# Patient Record
(demographics unavailable — no encounter records)

---

## 2025-02-20 NOTE — PLAN
[FreeTextEntry1] : 61 year M presented withnR foot submet 2 wound to subQ 2/'2 diabetic neuropathy - Pt seen and evaluated - R foot submet 2 wound to subQ, no malodor, no pus, no acute signs of infection - Uisng 15 blade, wound debridement to level of SubQ and not beyond, well tolertaed - TEREZA/PVR: NCV - Recommended vasular workup - Recommended Santyl to be applied to wound followed by DSD - Prescribed Doxycycline - RTC in 2 weeks

## 2025-03-04 NOTE — DATA REVIEWED
[FreeTextEntry1] : R TEREZA N/C R TBI 0.11 (toe pressure 17mmHg)  L TEREZA N/C L TBI 0.59 (toe pressure 90mmHg)

## 2025-03-04 NOTE — PHYSICAL EXAM
[Respiratory Effort] : normal respiratory effort [Normal Rate and Rhythm] : normal rate and rhythm [2+] : left 2+ [0] : left 0 [Skin Ulcer] : ulcer [Alert] : alert [Oriented to Person] : oriented to person [Oriented to Place] : oriented to place [Oriented to Time] : oriented to time [Calm] : calm [Ankle Swelling (On Exam)] : not present [Varicose Veins Of Lower Extremities] : not present [] : not present [Abdomen Tenderness] : ~T ~M No abdominal tenderness [de-identified] : appears stated age [de-identified] : normocephalic, atraumatic [de-identified] : supple

## 2025-03-04 NOTE — ASSESSMENT
[FreeTextEntry1] : Problem #1 peripheral vascular disease chronic limb threatening ischemia of the right lower extremity, Jackson 5 diminished toe pressure  will schedule for right leg angiogram, possible revascularization risks including but not limited to bleeding, infection, vessel injury, vessel thrombosis discussed with patient

## 2025-03-19 NOTE — HISTORY OF PRESENT ILLNESS
[FreeTextEntry1] : 61-year-old man with history of diabetes mellitus and hypertension presents with chronic right foot ulcer. Has known history of peripheral arterial disease. He complains of intermittent rest pain in his right foot.  03/10/25 - right leg diagnostic angiogram

## 2025-04-22 NOTE — HISTORY OF PRESENT ILLNESS
[FreeTextEntry1] : 61-year-old man with history of diabetes mellitus and hypertension presents with chronic right foot ulcer. Has known history of peripheral arterial disease. He complains of intermittent rest pain in his right foot.  03/10/25 - right leg diagnostic angiogram 03/19/25 - No interval changes. 04/08/25 - Presents to discuss right lower extremity transcatheter arterialization of the deep veins with LimFlow further.

## 2025-04-30 NOTE — HISTORY OF PRESENT ILLNESS
[FreeTextEntry1] : 61-year-old man with history of diabetes mellitus and hypertension presents with chronic right foot ulcer. Has known history of peripheral arterial disease. He complains of intermittent rest pain in his right foot.  03/10/25 - right leg diagnostic angiogram 03/19/25 - No interval changes. 04/08/25 - Presents to discuss right lower extremity transcatheter arterialization of the deep veins with LimFlow further. 04/22/25 - right foot wound has worsened. denies fevers or chills.

## 2025-05-01 NOTE — HISTORY OF PRESENT ILLNESS
[FreeTextEntry1] : Pt presented to wound care for R foot wound has been packing the wound then applying Santyl. Wound started by stepping on metal parts. [facility] for [facility] cellulitis, Pt was discharged on 1/15 on Keflex for 10 days. Pt had a limflow 2 weeks ago with Dr. Montague but it was not succssful. Not going for 2nd opnion at . Pt has been on Augmentin for a week. Pt has been doing dressing change by himself.

## 2025-05-01 NOTE — PLAN
[FreeTextEntry1] : 61 year M presented withnR foot submet 2 wound to subQ 2/'2 diabetic neuropathy - Pt seen and evaluated - R foot submet 2 wound to subQ, no malodor, no pus, no acute signs of infection - Uisng 15 blade, wound debridement to level of SubQ and not beyond, well tolertaed - TEREZA/PVR: NCV - s/p RLE Limflow 2 weeks ago but failed, Pt is seeking 2nd opinion at  - Recommended Santyl to be applied to wound followed by DSD - Continue current 1 week of augmentin then prescribed for another 2 weeks - Rec hyperbaric oxygen - RTC in 3 weeks

## 2025-05-13 NOTE — PROCEDURE
[FreeTextEntry1] : LE arterial duplex performed to evaluate LE perfusion as previous TEREZA/PVR measurements demonstrates non-compressible arteries and dampened waveforms at the ankles b/l demonstrates

## 2025-05-13 NOTE — ADDENDUM
[FreeTextEntry1] : This note was written by Joesph Jarrett, acting as a scribe for Dr. Aroldo Jack.  I, Dr. Aroldo Jack, have read and attest that all the information, medical decision-making, and discharge instructions within are true and accurate.  Mr. Nestor Gongora is a 61M w/ HTN, DM, and PAD who developed non-healing plantar R foot wound after he stepped on metal pieces requiring debridement s/p LLE limb-flow DVA procedure at [facility] by other vascular providers. The wound has gotten worse and he now presents to me for a 2nd opinion. Angiogram reviewed. Unfortunately, he failed DVA and has poor distal pedal arterial circulation. His wound is also a worrisome location with pressure. Arterial duplex US didn't show new obvious arterial lesions compared to what is known. I discussed his options are limited and I set possible expectations that if his wound gets worse he may ultimately need TMA vs BKA. He needs better local wound care and offloading. I agree with topical O2. I explained we will see if there is any improvement in his wound over next 2 weeks. He may need repeat angiogram. He appeared appreciative of my evaluation.  I, Dr. Aroldo Jack, personally performed the evaluation and management (E/M) services for this new patient.  That E/M includes conducting the initial examination, assessing all conditions, and establishing the plan of care.  Today, my ACP, Joesph Jarrett, was here to observe my evaluation and management services for this patient to be followed going forward.

## 2025-05-13 NOTE — PHYSICAL EXAM
[Normal Thyroid] : the thyroid was normal [Normal Breath Sounds] : Normal breath sounds [Respiratory Effort] : normal respiratory effort [Normal Heart Sounds] : normal heart sounds [Normal Rate and Rhythm] : normal rate and rhythm [Ankle Swelling Bilaterally] : bilaterally  [Ankle Swelling On The Right] : mild [Skin Ulcer] : ulcer [Alert] : alert [Calm] : calm [2+] : right 2+ [1+] : left 1+ [0] : left 0 [JVD] : no jugular venous distention  [Carotid Bruits] : no carotid bruits [Right Carotid Bruit] : no bruit heard over the right carotid [Left Carotid Bruit] : no bruit heard over the left carotid [Ankle Swelling (On Exam)] : not present [Varicose Veins Of Lower Extremities] : not present [] : not present [Abdomen Masses] : No abdominal masses [Abdomen Tenderness] : ~T ~M No abdominal tenderness [Purpura] : no purpura  [Petechiae] : no petechiae [Skin Induration] : no induration [de-identified] : Healthy, NAD [de-identified] : NC/AT, anicteric [de-identified] : FROM throughout, strength 5/5x4, no palpable cords in LEs b/l [de-identified] : 2x3.7x0.5cm open wound w/mucinous exudate noted at the R forefoot/base of the toes w/o tunneling sinus, +hyperpigmentation but no erythema [de-identified] : Neurosensory impaired to light touch in toes b/l

## 2025-05-13 NOTE — HISTORY OF PRESENT ILLNESS
[FreeTextEntry1] : 61yoM w/PMHx of [id number] and DM neuropathy, PVD, HTN, seen at an outside facility by a wound care podiatrist and vascular surgeon for a chronic R plantar DFU and rest pain.  Pt underwent multiple arterial and venous non-invasive testing which demonstrated non-compressible LE arteries b/l and dampened waveforms at the level of the ankle/distally and also underwent a LimFlow procedure at [facility] which was unsuccessful.  Pt states that he developed cellulitis of the R forefoot 5mos prior when he stepped on some metal pieces, site was debrided by an orthopedic surgeon who created a new surgical wound which has not been healing.  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  Currently, he is not offloading pressure from the R forefoot and has been dressing the wound daily w/Dakin's to cleanse and santyl to the wound.

## 2025-05-13 NOTE — ASSESSMENT
[FreeTextEntry1] : 61yoM w/PMHx of [id number] and DM neuropathy, PVD, HTN, seen at an outside facility by a wound care podiatrist and vascular surgeon for a chronic R plantar DFU and rest pain.  Pt underwent multiple arterial and venous non-invasive testing which demonstrated non-compressible LE arteries b/l and dampened waveforms at the level of the ankle/distally and also underwent a LimFlow procedure at [facility] which was unsuccessful.  Pt states that he developed cellulitis of the R forefoot 5mos prior when he stepped on some metal pieces, site was debrided by an orthopedic surgeon who created a new surgical wound which has not been healing.  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  On exam, a 2x3.7cm open wound w/mucinous exudate is noted at the R forefoot/base of the toes w/o tunneling sinus, +hyperpigmentation but no erythema.  RLE arterial duplex performed to evaluate for arterial circulation demonstrates patent vessels to the pop a, COOKIE occluded w/flow noted within the PTA and peroneal artery.  Explained to pt that revascularization may not be possible as his pedal arteries are not visualized on angio and may be occluded and there is no reliable target for bypass in the foot.  Recommend pt change daily wound care and cleanse wounds w/wound cleanser, dress wound w/santyl-->moistened gauze w/NS-->kerlix, also recommended use of a forefoot offloader.  Recommend pt RTO in 2wks for reevaluation; will consider redo angio if wound is not improved.

## 2025-05-13 NOTE — PHYSICAL EXAM
[Normal Thyroid] : the thyroid was normal [Normal Breath Sounds] : Normal breath sounds [Respiratory Effort] : normal respiratory effort [Normal Heart Sounds] : normal heart sounds [Normal Rate and Rhythm] : normal rate and rhythm [Ankle Swelling Bilaterally] : bilaterally  [Ankle Swelling On The Right] : mild [Skin Ulcer] : ulcer [Alert] : alert [Calm] : calm [2+] : right 2+ [1+] : left 1+ [0] : left 0 [JVD] : no jugular venous distention  [Carotid Bruits] : no carotid bruits [Right Carotid Bruit] : no bruit heard over the right carotid [Left Carotid Bruit] : no bruit heard over the left carotid [Ankle Swelling (On Exam)] : not present [Varicose Veins Of Lower Extremities] : not present [] : not present [Abdomen Masses] : No abdominal masses [Abdomen Tenderness] : ~T ~M No abdominal tenderness [Purpura] : no purpura  [Petechiae] : no petechiae [Skin Induration] : no induration [de-identified] : Healthy, NAD [de-identified] : NC/AT, anicteric [de-identified] : FROM throughout, strength 5/5x4, no palpable cords in LEs b/l [de-identified] : 2x3.7x0.5cm open wound w/mucinous exudate noted at the R forefoot/base of the toes w/o tunneling sinus, +hyperpigmentation but no erythema [de-identified] : Neurosensory impaired to light touch in toes b/l

## 2025-05-13 NOTE — ADDENDUM
[FreeTextEntry1] : This note was written by Joesph Jarrett, acting as a scribe for Dr. Aroldo Jack.  I, Dr. Aroldo Jack, have read and attest that all the information, medical decision-making, and discharge instructions within are true and accurate.  Mr. Nestor Gongora is a 61M w/ HTN, DM, and PAD who developed non-healing plantar R foot wound after he stepped on metal pieces requiring debridement s/p LLE limb-flow DVA procedure at [facility] by other vascular providers. The wound has gotten worse and he now presents to me for a 2nd opinion. Angiogram reviewed. Unfortunately, he failed DVA and has poor distal pedal arterial circulation. His wound is also a worrisome location with pressure. Arterial duplex US didn't show new obvious arterial lesions compared to what is known. I discussed his options are limited and I set possible expectations that if his wound gets worse he may ultimately need TMA vs BKA. He needs better local wound care and offloading. I agree with topical O2. I explained we will see if there is any improvement in his wound over next 2 weeks. He may need repeat angiogram. He appeared appreciative of my evaluation.  I, Dr. Aroldo Jack, personally performed the evaluation and management (E/M) services for this new patient.  That E/M includes conducting the initial examination, assessing all conditions, and establishing the plan of care.  Today, my ACP, Joesph Jarrett, was here to observe my evaluation and management services for this patient to be followed going forward.

## 2025-05-13 NOTE — ASSESSMENT
[FreeTextEntry1] : 61yoM w/PMHx of [id number] and DM neuropathy, PVD, HTN, seen at an outside facility by a wound care podiatrist and vascular surgeon for a chronic R plantar DFU and rest pain.  Pt underwent multiple arterial and venous non-invasive testing which demonstrated non-compressible LE arteries b/l and dampened waveforms at the level of the ankle/distally and also underwent a LimFlow procedure at [facility] which was unsuccessful.  Pt states that he developed cellulitis of the R forefoot 5mos prior when he stepped on some metal pieces, site was debrided by an orthopedic surgeon who created a new surgical wound which has not been healing.  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  On exam, a 2x3.7cm open wound w/mucinous exudate is noted at the R forefoot/base of the toes w/o tunneling sinus, +hyperpigmentation but no erythema.  RLE arterial duplex performed to evaluate for arterial circulation demonstrates patent vessels to the pop a, COOKIE occluded w/flow noted within the PTA and peroneal artery.  Explained to pt that revascularization may not be possible as his pedal arteries are not visualized on angio and may be occluded and there is no reliable target for bypass in the foot.  Recommend pt change daily wound care and cleanse wounds w/wound cleanser, dress wound w/santyl-->moistened gauze w/NS-->kerlix, also recommended use of a forefoot offloader.  Recommend pt RTO in 2wks for reevaluation; will consider redo angio if wound is not improved.

## 2025-05-13 NOTE — ASSESSMENT
[FreeTextEntry1] : 61yoM w/PMHx of [id number] and DM neuropathy, PVD, HTN, seen at an outside facility by a wound care podiatrist and vascular surgeon for a chronic R plantar DFU and rest pain.  Pt underwent multiple arterial and venous non-invasive testing which demonstrated non-compressible LE arteries b/l and dampened waveforms at the level of the ankle/distally and also underwent a LimFlow procedure at [facility] which was unsuccessful.  Pt states that he developed cellulitis of the R forefoot 5mos prior when he stepped on some metal pieces, site was debrided by an orthopedic surgeon who created a new surgical wound which has not been healing.  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  On exam, a 2x3.7cm open wound w/mucinous exudate is noted at the R forefoot/base of the toes w/o tunneling sinus, +hyperpigmentation but no erythema.  RLE arterial duplex performed to evaluate for arterial circulation demonstrates patent vessels to the pop a, COOKIE occluded w/flow noted within the PTA and peroneal artery.  Explained to pt that revascularization may not be possible as his pedal arteries are not visualized on angio and may be occluded and there is no reliable target for bypass in the foot.  Recommend pt change daily wound care and cleanse wounds w/wound cleanser, dress wound w/santyl-->moistened gauze w/NS-->kerlix, also recommended use of a forefoot offloader.  Recommend pt RTO in 2wks for reevaluation; will consider redo angio if wound is not improved.

## 2025-05-13 NOTE — PHYSICAL EXAM
[Normal Thyroid] : the thyroid was normal [Normal Breath Sounds] : Normal breath sounds [Respiratory Effort] : normal respiratory effort [Normal Heart Sounds] : normal heart sounds [Normal Rate and Rhythm] : normal rate and rhythm [Ankle Swelling Bilaterally] : bilaterally  [Ankle Swelling On The Right] : mild [Skin Ulcer] : ulcer [Alert] : alert [Calm] : calm [2+] : right 2+ [1+] : left 1+ [0] : left 0 [JVD] : no jugular venous distention  [Carotid Bruits] : no carotid bruits [Right Carotid Bruit] : no bruit heard over the right carotid [Left Carotid Bruit] : no bruit heard over the left carotid [Ankle Swelling (On Exam)] : not present [Varicose Veins Of Lower Extremities] : not present [] : not present [Abdomen Masses] : No abdominal masses [Abdomen Tenderness] : ~T ~M No abdominal tenderness [Purpura] : no purpura  [Petechiae] : no petechiae [Skin Induration] : no induration [de-identified] : Healthy, NAD [de-identified] : NC/AT, anicteric [de-identified] : FROM throughout, strength 5/5x4, no palpable cords in LEs b/l [de-identified] : 2x3.7x0.5cm open wound w/mucinous exudate noted at the R forefoot/base of the toes w/o tunneling sinus, +hyperpigmentation but no erythema [de-identified] : Neurosensory impaired to light touch in toes b/l

## 2025-05-13 NOTE — HISTORY OF PRESENT ILLNESS
[FreeTextEntry1] : 61yoM w/PMHx of [id number] and DM neuropathy, PVD, HTN, seen at an outside facility by a wound care podiatrist and vascular surgeon for a chronic R plantar DFU and rest pain.  Pt underwent multiple arterial and venous non-invasive testing which demonstrated non-compressible LE arteries b/l and dampened waveforms at the level of the ankle/distally and also underwent a LimFlow procedure at [facility] which was unsuccessful.  Pt states that he developed cellulitis of the R forefoot 5mos prior when he stepped on some metal pieces, site was debrided by an orthopedic surgeon who created a new surgical wound which has not been healing.  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  Currently, he is not offloading pressure from the R forefoot and has been dressing the wound daily w/Dakin's to cleanse and santyl to the wound.

## 2025-05-13 NOTE — ADDENDUM
[FreeTextEntry1] : This note was written by Joesph Jarrett, acting as a scribe for Dr. Aroldo Jack.  I, Dr. Aroldo Jack, have read and attest that all the information, medical decision-making, and discharge instructions within are true and accurate.  Mr. Nestor Gongora is a 61M w/ HTN, DM, and PAD who developed non-healing plantar R foot wound after he stepped on metal pieces requiring debridement s/p LLE limb-flow DVA procedure at [facility] by other vascular providers. The wound has gotten worse and he now presents to me for a 2nd opinion. Angiogram reviewed. Unfortunately, he failed DVA and has poor distal pedal arterial circulation. His wound is also a worrisome location with pressure. Arterial duplex US didn't show new obvious arterial lesions compared to what is known. I discussed his options are limited and I set possible expectations that if his wound gets worse he may ultimately need TMA vs BKA. He needs better local wound care and offloading. I agree with topical O2. I explained we will see if there is any improvement in his wound over next 2 weeks. He may need repeat angiogram. He appeared appreciative of my evaluation.  I, Dr. Aroldo Jack, personally performed the evaluation and management (E/M) services for this new patient.  That E/M includes conducting the initial examination, assessing all conditions, and establishing the plan of care.  Today, my ACP, Joesph Jarrett, was here to observe my evaluation and management services for this patient to be followed going forward.

## 2025-05-13 NOTE — HISTORY OF PRESENT ILLNESS
[FreeTextEntry1] : 61yoM w/PMHx of [id number] and DM neuropathy, PVD, HTN, seen at an outside facility by a wound care podiatrist and vascular surgeon for a chronic R plantar DFU and rest pain.  Pt underwent multiple arterial and venous non-invasive testing which demonstrated non-compressible LE arteries b/l and dampened waveforms at the level of the ankle/distally and also underwent a LimFlow procedure at [facility] which was unsuccessful.  Pt states that he developed cellulitis of the R forefoot 5mos prior when he stepped on some metal pieces, site was debrided by an orthopedic surgeon who created a new surgical wound which has not been healing.  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  Currently, he is not offloading pressure from the R forefoot and has been dressing the wound daily w/Dakin's to cleanse and santyl to the wound.

## 2025-05-22 NOTE — PLAN
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 Travels to work by train. Continue WBAT in surgical shoe as previously dispensed. - Recommend daily Santyl with topical O2 dressings to be applied to wound followed by DSD - Completed course of Augmentin on 5/21, no antibiotics required at this time - RTC in 3 weeks

## 2025-05-22 NOTE — HISTORY OF PRESENT ILLNESS
[FreeTextEntry1] : Pt presented to wound care for R foot wound has been packing the wound then applying Santyl. Last week receieved the oxygen machine. Prior to then was using Santyl. Wife states that she discontinued Santyl after reading online articles stating that the petroleum can interfere with the oxygenation. Wife also reports drainage so mentioned that rather than changing dressing every other day, has been changing it daily. Wound started by stepping on metal parts. [facility] for R cellulitis, Pt was discharged on 1/15 on Keflex for 10 days. Pt had a limflow procedure done on 4/11 with Dr. Montague, but it was not successful. Went for a second opinion with a Dr. Gerda Guerrero and got negative feedback. Was told they needed an amputation and to come back but refused. Now has made an apt with a  at [facility]/[place] on June 2nd for a 3rd opinion. Finished Augmentin yesterday 5/21 after completing 3weeks course.   Morning FBS: 118mg/dL (5/17) does not take daily Last A1c: 6.7% (January 2025)

## 2025-06-12 NOTE — PLAN
[FreeTextEntry1] : 61M presented within R foot 1st-3rd sulcus wound to subQ 2/'2 diabetic neuropathy and PVD - Pt seen and evaluated - R foot 1st-3rd sulcus wound to subQ, no malodor, no pus, no acute signs of infection - Using sterile curette and suture removal kit, debrided wound down to level of SubQ and not beyond, well tolerated. 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 Continue WBAT in surgical shoe as previously dispensed. - Recommend daily Santyl with topical O2 dressings to be applied to wound followed by DSD - Educated and discussed signs of infection with patient and advised patient to go to ED immediately if any infection signs present.  - RTC in 2 weeks

## 2025-06-12 NOTE — HISTORY OF PRESENT ILLNESS
[FreeTextEntry1] : Pt presented to wound care for R foot wound has been packing the wound then applying Santyl. Previously, the oxygen machine. Prior to then was using Santyl. Wife states that she discontinued Santyl after reading online articles stating that the petroleum can interfere with the oxygenation. Wife also reports drainage so mentioned that rather than changing dressing every other day, has been changing it daily. Wound started by stepping on metal parts. Was [facility] admitted at [facility] on 1[facility] for R cellulitis, Pt was discharged on 1/15 on Keflex for 10 days. Pt had a limflow procedure done on 4/11 with Dr. Montague, but it was not successful. Went for a second opinion with a Dr. Gerda Guerrero and got negative feedback. Was told they needed an amputation and to come back but refused. A  at [facility] on June 2nd for a 3rd opinion and was recommended for ampuation. Patient went to see another vascular doctor at [place] for another opinion and was sent for a CTA to be performed today. Patient has been using the santyl and topical oxygen for the wounds. Finished Augmentin 5/21 after completing 3weeks course.   Morning FBS: 110 (6/8/25) Last A1c: 6.7% (January 2025)

## 2025-06-26 NOTE — HISTORY OF PRESENT ILLNESS
[FreeTextEntry1] : Pt presented to wound care for R foot wound has been packing the wound then applying Santyl. Previously, the oxygen machine. Prior to then was using Santyl. Wife states that she discontinued Santyl after reading online articles stating that the petroleum can interfere with the oxygenation. Wife also reports drainage so mentioned that rather than changing dressing every other day, has been changing it daily. Wound started by stepping on metal parts. Was recently admitted at [facility] on 1[facility]9 for R cellulitis, Pt was discharged on 1/15 on Keflex for 10 days. Pt had a limflow procedure done on 4/11 with Dr. Montague, but it was not successful. Went for a second opinion with a Dr. Gerda Guerrero and got negative feedback. Was told they needed an amputation and to come back but refused. A  at [facility] on June 2nd for a 3rd opinion and was recommended for ampuation. Patient went to see another vascular doctor at [place] for another opinion and was sent for a CTA to be performed today. Patient has been using the santyl and topical oxygen for the wounds. Finished Augmentin 5/21 after completing 3weeks course.   Morning FBS: 130 (6/26/25) Last A1c: 6.7% (January 2025)

## 2025-06-26 NOTE — PLAN
[FreeTextEntry1] : 61M presented within R foot 1st-3rd sulcus wound to bone 2/'2 diabetic neuropathy and PVD - Pt seen and evaluated - R foot 1st-3rd sulcus wound to bone, no malodor, no pus, fibronecrotic base, medial 5th PIPJ, lateral and medial 4th digit,  lateral 4rd digit wounds to subQ, no acute signs of infection - Using sterile curette and scissor, debrided wound down to level of exposed flexor tendon to 2nd toe. Wound is deeper but  w less necrotic tissue, prognosis is guarded to poor. Tendon is viable and moist but totally exposed. well tolerated. 6/12 R foot wound culture: MRSA, susceptible to doxy, E coli, responding to p.o. abx. 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 Travels to work by train. Continue WBAT in surgical shoe as previously dispensed. - Recommend daily Santyl with topical O2 dressings to be applied to wound followed by DSD - Educated and discussed signs of infection with patient and advised patient to go to ED immediately if any infection signs present.  - RTC in 7/10

## 2025-07-10 NOTE — HISTORY OF PRESENT ILLNESS
[FreeTextEntry1] : Pt presented to wound care for R foot wound has been packing the wound then applying Santyl. Previously, the oxygen machine. Prior to then was using Santyl. Wife states that she discontinued Santyl after reading online articles stating that the petroleum can interfere with the oxygenation. Wife also reports drainage so mentioned that rather than changing dressing every other day, has been changing it daily. Wound started by stepping on metal parts. [facility] recently admitted [facility] on 1[facility] for R cellulitis, Pt was discharged on 1/15 on Keflex for 10 days. Pt had a limflow procedure done on 4/11 with Dr. Montague, but it was not successful. Went for a second opinion with a Dr. Gerda uGerrero and got negative feedback. Was told they needed an amputation and to come back but refused. A  at [facility]/[place] on June 2nd for a 3rd opinion and was recommended for ampuation. Patient went to see another vascular doctor at [place] for another opinion and was sent for a CTA to be performed today. Patient has been using the santyl and topical oxygen for the wounds. Finished Augmentin 5/21 after completing 3weeks course. Pt is now having procedure done at [place] next Tuesday 7/15  Morning FBS: 122 (7/10/25) Last A1c: 6.7% (January 2025)

## 2025-07-10 NOTE — PLAN
[FreeTextEntry1] : 61M presented within R foot 1st-3rd sulcus wound to bone 2/'2 diabetic neuropathy and PVD - Pt seen and evaluated - R foot 1st-3rd sulcus wound to bone, no malodor, no pus, fibronecrotic base, medial 5th PIPJ, lateral and medial 4th digit,  lateral 4rd digit wounds to subQ, no acute signs of infection - Using sterile curette, debrided wound down to level of exposed flexor tendon to 2nd toe. Wound is deeper but  w less necrotic tissue, prognosis is guarded to poor. Tendon is viable and moist but totally exposed. well tolerated. 6/12 R foot wound culture: MRSA, susceptible to doxy, E coli, responding to p.o. abx. 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 Continue WBAT in surgical shoe as previously dispensed. - Recommend daily Santyl with topical O2 dressings to be applied to wound followed by DSD. - Educated and discussed signs of infection with patient and advised patient to go to ED immediately if any infection signs present.  - RTC in 2 weeks.

## 2025-07-24 NOTE — PLAN
[FreeTextEntry1] : 61M presented within R foot 1st-3rd sulcus wound to bone 2/'2 diabetic neuropathy and PVD - Pt seen and evaluated - R foot 1st-3rd sulcus wound to bone, no malodor, no pus, fibronecrotic base, medial 5th PIPJ, lateral and medial 4th digit,  lateral 4rd digit wounds to subQ, no acute signs of infection. Right foot plantar 1st metatarsal wound to subq with a sinus tract to the right foot 1-3 sulcus wound, no erythema, no purulence. - Using sterile curette, debrided wound down to level of exposed flexor tendon to 2nd toe. Wound is deeper but  w less necrotic tissue, prognosis is guarded to poor. Tendon is viable and moist but totally exposed. well tolerated. 6/12 R foot wound culture: MRSA, susceptible to doxy, E coli, responding to p.o. abx. -Wound packed using 1/4 inch iodoform packing, GV on the right 3rd interspace, dressed with 4x4 gauze, abd pad and leo. 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 Continue WBAT in surgical shoe as previously dispensed. - Recommend daily Santyl with topical O2 dressings to be applied to wound followed by DSD. - Educated and discussed signs of infection with patient and advised patient to go to ED immediately if any infection signs present.  -Prescribed minocycline for 10 days with 3 refills - RTC in 2 weeks.

## 2025-07-24 NOTE — HISTORY OF PRESENT ILLNESS
[FreeTextEntry1] : Pt presented to wound care for R foot wound has been packing the wound then applying Santyl. Previously, the oxygen machine. Prior to then was using Santyl. Wife states that she discontinued Santyl after reading online articles stating that the petroleum can interfere with the oxygenation. Wife also reports drainage so mentioned that rather than changing dressing every other day, has been changing it daily. Wound started by stepping on metal parts. [facility] 1[facility] for R cellulitis, Pt was discharged on 1/15 on Keflex for 10 days. Pt had a limflow procedure done on 4/11 with Dr. Montague, but it was not successful. Went for a second opinion with a Dr. Gerda Guerrero and got negative feedback. Was told they needed an amputation and to come back but refused. A  at [facility] on June 2nd for a 3rd opinion and was recommended for ampuation. Patient went to see another vascular doctor at [place] for another opinion and was sent for a CTA to be performed today. Patient has been using the santyl and topical oxygen for the wounds. Finished Augmentin 5/21 after completing 3weeks course.   Morning FBS: unknown (7/24/25) Last A1c: 6.7% (January 2025)  7/24 - Pt presents for right foot wound. 7/15 s/p RLE angio with angioplasty and stent.